# Patient Record
Sex: MALE | Race: WHITE | Employment: FULL TIME | URBAN - NONMETROPOLITAN AREA
[De-identification: names, ages, dates, MRNs, and addresses within clinical notes are randomized per-mention and may not be internally consistent; named-entity substitution may affect disease eponyms.]

---

## 2023-02-27 ENCOUNTER — OFFICE VISIT (OUTPATIENT)
Dept: PRIMARY CARE CLINIC | Age: 33
End: 2023-02-27

## 2023-02-27 VITALS
RESPIRATION RATE: 16 BRPM | WEIGHT: 200 LBS | HEIGHT: 73 IN | OXYGEN SATURATION: 98 % | BODY MASS INDEX: 26.51 KG/M2 | HEART RATE: 68 BPM

## 2023-02-27 DIAGNOSIS — S61.512A WRIST LACERATION, LEFT, INITIAL ENCOUNTER: Primary | ICD-10-CM

## 2023-02-27 ASSESSMENT — ENCOUNTER SYMPTOMS
COLOR CHANGE: 0
PHOTOPHOBIA: 0
CHEST TIGHTNESS: 0

## 2023-02-27 NOTE — LETTER
[address]  Phone: [phone]  Fax: [phone], APRN - CNP        February 27, 2023     Patient: Shirley Cobian   YOB: 1990   Date of Visit: 2/27/2023       To Whom It May Concern: It is my medical opinion that Shirley Cobian may return to work on 2/27/2023. He was injured in a work related accident and has been advised to do not heavy lifting pushing or pulling or grasping with the left hand. goal to prevent wrist flexion and extension until wound fully heals. we will see him back in am to evaluate the wound. If you have any questions or concerns, please don't hesitate to call.     Sincerely,        ALONDRA Cristina CNP

## 2023-02-27 NOTE — PATIENT INSTRUCTIONS
Monitor for increased pain, redness or swelling  Follow up in am for wound check. Keep clean and dry  Bandage as shown and ace for compression with wrist splint. Do not soak or get wet.    Wound check 2/28/2023 08:30

## 2023-02-27 NOTE — PROGRESS NOTES
[address]  Dept: [phone]  Dept Fax: [phone]  Loc Appt: [phone]  Loc Fax: [phone]    Earnest Roman is a 29 y.o. male who presents today for his medical conditions/complaints as noted below. Chief Complaint   Patient presents with    Laceration     Laceration to  left wrist, clamp cut him            HPI:     Columba Clark presents with a laceration along the volar surface of the left wrist he sustained from an injury at work. A clamp cut the skin. Pressure was applied and brought to [facility] for Treatment and evaluation by the ERT team    Pressure bandage and manual pressure noted. Bleeding controlled. No sign of nerve or vascular compromise. Pt was brought into the [facility] for wound care and closure of the skin    The laceration is along the volar surface of the left wrist, ulnar surface. Laceration   The incident occurred less than 1 hour ago. Pain location: left wrist. The laceration is 4 cm in size. The laceration mechanism was a metal edge. The pain is at a severity of 2/10. The pain is mild. The pain has been Constant since onset. He reports no foreign bodies present. His tetanus status is UTD. No current outpatient medications on file. No current facility-administered medications for this visit. No Known Allergies    Subjective:      Review of Systems   Constitutional:  Negative for chills, diaphoresis, fatigue and fever. HENT:  Negative for congestion. Eyes:  Negative for photophobia and visual disturbance. Respiratory:  Negative for chest tightness. Cardiovascular:  Negative for chest pain. Genitourinary:  Negative for difficulty urinating. Skin:  Positive for wound. Negative for color change. Neurological:  Negative for dizziness, tremors, weakness and numbness. Psychiatric/Behavioral:  Negative for dysphoric mood.  The patient is not nervous/anxious. Objective:     Pulse 68   Resp 16   Ht 6' 1\" (1.854 m)   Wt 200 lb (90.7 kg)   SpO2 98%   BMI 26.39 kg/m²     Physical Exam  Vitals reviewed. Constitutional:       General: He is not in acute distress. Appearance: Normal appearance. He is not ill-appearing. HENT:      Head: Normocephalic. Eyes:      Pupils: Pupils are equal, round, and reactive to light. Cardiovascular:      Rate and Rhythm: Normal rate. Pulses: Normal pulses. Pulmonary:      Effort: Pulmonary effort is normal.   Musculoskeletal:         General: Normal range of motion. Left wrist: Laceration present. Normal range of motion. Normal pulse. Arms:    Lymphadenopathy:      Cervical: No cervical adenopathy. Skin:     General: Skin is warm and dry. Capillary Refill: Capillary refill takes less than 2 seconds. Findings: Erythema and lesion present. No bruising. Comments: 4 cm laceration, ulnar region. Superficial  Wound edges clean wound able to be approximated  Sang drainage: with pressure bleeding controlled   Neurological:      General: No focal deficit present. Mental Status: He is alert and oriented to person, place, and time. Sensory: Sensation is intact. Motor: Motor function is intact. No weakness or abnormal muscle tone. Deep Tendon Reflexes: Reflexes are normal and symmetric. Psychiatric:         Mood and Affect: Mood normal.         Behavior: Behavior normal.         Assessment:      1. Wrist laceration, left, initial encounter      Plan:     Discussed options for closure. The skin is able to be approximated and bleding controlled  He chose derma bond glue adhesive in lieu of sutures  Wound was cleansed with chlorhexidine and sterile water.   NO FB noted  Applied Derma bond across the laceration  Used 3 steri strips as tension for reinforcement  Advised  Avoid extreme use of left hand/arm: no lifting grasping pushing or pulling for 48 hours minimally      Applied ace for compression and wrist splint to reduce flexion and extension of the wrist to allow for healing and prevent wound from opening. Immanuel Settler voiced understanding of instructions additionally he was provided with wound care supplies  He perform full active ROM to  the wrist, fingers of the left hand    Demonstrated  normal  and no sing of muscle weakness or signs of vascular involvement. no signs of circulatory compromise proxima land distal to injury  Skin pink warm and dry  Brisk refill to nail beds  Will see in am for wound check. AVS with instruction for laceration care with closure with adhesive. Patient given educational materials on AVS for laceration care. Discussed use, benefit, and side effects of prescribed medications. Barriers to medication compliance addressed. All patient questions answered. Pt voiced understanding. Return in about 1 day (around 2/28/2023).               Electronically signed by ALONDRA Johnson CNP on 2/27/2023 at 4:33 PM

## 2023-02-28 ENCOUNTER — SCHEDULED TELEPHONE ENCOUNTER (OUTPATIENT)
Dept: PRIMARY CARE CLINIC | Age: 33
End: 2023-02-28

## 2023-02-28 DIAGNOSIS — S61.512A WRIST LACERATION, LEFT, INITIAL ENCOUNTER: Primary | ICD-10-CM

## 2023-02-28 ASSESSMENT — ENCOUNTER SYMPTOMS: COLOR CHANGE: 0

## 2023-02-28 NOTE — PATIENT INSTRUCTIONS
Limited use of left hand until heals. Elevate to reduce swelling  Ace for compression  Do not sleep in wrist splint. Dressing change daily as previously instructed. Avoid getting wet.

## 2023-02-28 NOTE — PROGRESS NOTES
[address] [place] [address] [address] SOMERS [address]  Dept: [phone]  Dept Fax: [phone]  Loc Appt: [phone]  Loc Fax: [phone]    Farhana Santacruz is a 29 y.o. male who presents today for his medical conditions/complaints as noted below. No chief complaint on file. HPI:     Follow up check for laceration of the left wrist.   Sustained a laceration yesterday and was closed with adhesive. Today denies pain, reports some swelling in the left hand. He has been wearing the compression ace and wrist spling to reduce flexion and extension of the left wrist.       Laceration   The incident occurred 12 to 24 hours ago. Pain location: left wrist. The laceration is 4 cm in size. The laceration mechanism was a metal edge. The pain is at a severity of 0/10. The patient is experiencing no pain. He reports no foreign bodies present. His tetanus status is UTD. No current outpatient medications on file. No current facility-administered medications for this visit. No Known Allergies    Subjective:      Review of Systems   Constitutional:  Positive for activity change. Restrictions of use left hand/wrist   Musculoskeletal:  Positive for myalgias. Negative for arthralgias and joint swelling. Skin:  Positive for wound. Negative for color change and pallor. Neurological:  Positive for weakness. Objective: There were no vitals taken for this visit. Physical Exam  Constitutional:       Appearance: Normal appearance. He is not ill-appearing or diaphoretic. Cardiovascular:      Pulses: Normal pulses. Musculoskeletal:         General: Swelling present. Right hand: Swelling present. Comments: Soft tissue of swelling involving the left hand: feel this is from bandage and constriction of the wrap placed yesterday  Torie Mendoza has.  No sensory deficit  involving the fingers or structures proximally or distally to the laceration    Cap refill of nailbeds brisk  Skin is [ink warm and dry  No sensory deficit  ROM of the left wrist reduced due to pulling sensation with wrist flexion and extension: this is to prevent opening of the skin of the laceration and keep adhesive from pulling or lifting     Skin:     General: Skin is warm and dry. Capillary Refill: Capillary refill takes less than 2 seconds. Findings: Bruising present. No erythema. Comments: Glue adhesive intact with steri strips  Wound edges are approximated    Mild bruising at site of laceration  No drainage or s/s of wound infection     Neurological:      General: No focal deficit present. Mental Status: He is alert and oriented to person, place, and time. Sensory: Sensation is intact. No sensory deficit. Deep Tendon Reflexes: Reflexes are normal and symmetric. Comments: Radial and ulnar pulses palpable and normal         Assessment:      1. Wrist laceration, left, initial encounter  Healing as per normal progress    Plan:     Limited use of left hand until heals.:restrictions extended through Friday    Elevate to reduce swelling of the hand  Bandage applied and wrapped   Ace for compression  Do not sleep in wrist splint. Dressing change daily as previously instructed. Avoid getting wet. May apply ice prn    Follow up in am      Discussed use, benefit, and side effects of prescribed medications. Barriers to medication compliance addressed. All patient questions answered. Pt voiced understanding. Return in about 1 day (around 3/1/2023).                 Electronically signed by ALONDRA Rosa CNP on 2/28/2023 at 9:26 AM

## 2023-03-01 ENCOUNTER — OFFICE VISIT (OUTPATIENT)
Dept: PRIMARY CARE CLINIC | Age: 33
End: 2023-03-01

## 2023-03-01 VITALS
BODY MASS INDEX: 26.51 KG/M2 | HEART RATE: 94 BPM | OXYGEN SATURATION: 98 % | WEIGHT: 200 LBS | RESPIRATION RATE: 16 BRPM | HEIGHT: 73 IN

## 2023-03-01 DIAGNOSIS — S61.512A WRIST LACERATION, LEFT, INITIAL ENCOUNTER: Primary | ICD-10-CM

## 2023-03-01 ASSESSMENT — ENCOUNTER SYMPTOMS: COLOR CHANGE: 0

## 2023-03-01 NOTE — LETTER
March 1, 2023       Zuri Dai YOB: 1990   [address] Date of Visit:  3/1/2023       To Whom It May Concern: It is my medical opinion that Zuri Dai may return to work on 3/1/2023. Contine no hevy lifting, grasping pushing or pulling with left hand/arm will see back Monday and advance as the laceration heals. If you have any questions or concerns, please don't hesitate to call.     Sincerely,        Mena Raygoza, APRN - CNP

## 2023-03-01 NOTE — PROGRESS NOTES
[address]  Dept: [phone]  Dept Fax: [phone]  Loc Appt: [phone]  Loc Fax: [phone]    Cirilo Ingram is a 29 y.o. male who presents today for his medical conditions/complaints as noted below. Chief Complaint   Patient presents with    Wound Check     Follow up for laceration to left wrist             HPI:     Follow up check for laceration of the left wrist.     Today denies pain, reports the swelling in the hand fingers is resolving    He has been wearing the compression ace and wrist splint to reduce flexion and extension of the left wrist while working      Laceration   The incident occurred 3 to 5 days ago. Pain location: left wrist. The laceration is 4 cm in size. The laceration mechanism was a metal edge. The pain is at a severity of 0/10. The patient is experiencing no pain. He reports no foreign bodies present. His tetanus status is UTD. Wound Check      No current outpatient medications on file. No current facility-administered medications for this visit. No Known Allergies    Subjective:      Review of Systems   Constitutional:  Positive for activity change. Restrictions of use left hand/wrist   Musculoskeletal:  Positive for myalgias. Negative for arthralgias and joint swelling. Skin:  Positive for wound. Negative for color change and pallor. Neurological:  Positive for weakness. Objective:     Pulse 94   Resp 16   Ht 6' 1\" (1.854 m)   Wt 200 lb (90.7 kg)   SpO2 98%   BMI 26.39 kg/m²     Physical Exam  Constitutional:       Appearance: Normal appearance. He is not ill-appearing or diaphoretic. Cardiovascular:      Pulses: Normal pulses. Musculoskeletal:         General: No swelling. Right hand: No swelling. Comments: Soft tissue of swelling resolveed  Gavino Chaney has.  No sensory deficit  involving the fingers or structures proximally or distally to the laceration    Cap refill of nailbeds brisk  Skin is [ink warm and dry  No sensory deficit  ROM of the left wrist reduced due to pulling sensation with wrist flexion and extension: this is to prevent opening of the skin of the laceration and keep adhesive from pulling or lifting     Skin:     General: Skin is warm and dry. Capillary Refill: Capillary refill takes less than 2 seconds. Findings: No bruising or erythema. Comments: Glue adhesive lifted with steri strips  Wound edges are approximated, slight gape of the wound edge mid wound  Appears healing, granulation tissue noted    Mild bruising resolving at site of laceration  No drainage or s/s of wound infection present     Neurological:      General: No focal deficit present. Mental Status: He is alert and oriented to person, place, and time. Sensory: Sensation is intact. No sensory deficit. Deep Tendon Reflexes: Reflexes are normal and symmetric. Comments: Radial and ulnar pulses palpable and normal         Assessment:      1. Wrist laceration, left, initial encounter  Healing as per normal progress      Plan: Today removed steri strips and glue adhesive that lifted from the skin. Tissue appears may have been mosit. Slight maceration noted to the skin  Reapplied 3 steri strips. For skin approximation. No abnormal drainage  Will follow up Monday for wound check and continue same instructioons  Keep clean and dry  Limited use of left hand until heals.:restrictions extended through Friday    Elevate to reduce swelling of the hand    Ace for compression  Do not sleep in wrist splint. Dressing change daily as previously instructed. Avoid getting wet. Follow up Monday      Discussed use, benefit, and side effects of prescribed medications. Barriers to medication compliance addressed. All patient questions answered. Pt voiced understanding. Return as scheduled.              Electronically signed by ALONDRA Vick - CNP on 3/1/2023 at 11:46 AM

## 2023-03-06 ENCOUNTER — OFFICE VISIT (OUTPATIENT)
Dept: PRIMARY CARE CLINIC | Age: 33
End: 2023-03-06

## 2023-03-06 VITALS
RESPIRATION RATE: 16 BRPM | HEIGHT: 73 IN | OXYGEN SATURATION: 98 % | WEIGHT: 200 LBS | HEART RATE: 94 BPM | BODY MASS INDEX: 26.51 KG/M2

## 2023-03-06 DIAGNOSIS — S61.512A WRIST LACERATION, LEFT, INITIAL ENCOUNTER: Primary | ICD-10-CM

## 2023-03-06 ASSESSMENT — ENCOUNTER SYMPTOMS: COLOR CHANGE: 0

## 2023-03-06 NOTE — LETTER
March 6, 2023       Deanna Simmons YOB: 1990   [address] Date of Visit:  3/6/2023       To Whom It May Concern: It is my medical opinion that Deanna Simmons may return to work on 3/6/23. may use left hand. no lifting > 20#. follow up Thursday for wound check. He should follow up ASAP if he has any issues advancing activity of use of the left hand. If you have any questions or concerns, please don't hesitate to call.     Sincerely,        Kelsey Spivey, APRN - CNP

## 2023-08-11 NOTE — LETTER
February 28, 2023       Lauren Mulligan YOB: 1990   [address] Date of Visit:  2/28/2023       To Whom It May Concern: It is my medical opinion that Lauren Mulligan may return to work on 2/28/2023 with restrictions. Advised to continue no heavy lifting, pushing or pulling with left arm until laceration heals. no repetitive grasping or gripping with left hand. These restrictions will be in place through 3/3/2023. We will see him back tomorrow 3/1/2023 for a wound check. This is a work related injury. If you have any questions or concerns, please don't hesitate to call.     Sincerely,        ALONDRA Johnson - CNP [facility]